# Patient Record
Sex: FEMALE | Race: ASIAN | URBAN - METROPOLITAN AREA
[De-identification: names, ages, dates, MRNs, and addresses within clinical notes are randomized per-mention and may not be internally consistent; named-entity substitution may affect disease eponyms.]

---

## 2022-12-08 ENCOUNTER — OFFICE VISIT (OUTPATIENT)
Facility: LOCATION | Age: 57
End: 2022-12-08
Payer: COMMERCIAL

## 2022-12-08 DIAGNOSIS — H40.013 OPEN ANGLE WITH BORDERLINE FINDINGS, LOW RISK, BILATERAL: ICD-10-CM

## 2022-12-08 DIAGNOSIS — D23.10 OTH BENIGN NEOPLASM SKIN/ UNSP EYELID, INCLUDING CANTHUS: Primary | ICD-10-CM

## 2022-12-08 DIAGNOSIS — H25.13 AGE-RELATED NUCLEAR CATARACT, BILATERAL: ICD-10-CM

## 2022-12-08 DIAGNOSIS — H04.123 TEAR FILM INSUFFICIENCY OF BILATERAL LACRIMAL GLANDS: ICD-10-CM

## 2022-12-08 PROCEDURE — 92004 COMPRE OPH EXAM NEW PT 1/>: CPT | Performed by: OPTOMETRIST

## 2022-12-08 ASSESSMENT — INTRAOCULAR PRESSURE
OD: 12
OS: 12

## 2022-12-08 ASSESSMENT — VISUAL ACUITY
OD: 20/20
OS: 20/20

## 2022-12-08 NOTE — IMPRESSION/PLAN
Impression: Open angle with borderline findings, low risk, bilateral: H40.013. Plan: Discussed findings of today's exam with patient, including diagnosis in detail. Would like patient to return for testing to rule out Glaucoma. Recommend  OCT, Pachymetry, Gonioscopy and Glaucoma Evaluation next available. Patient aware this appointment will take longer than the average appointment. Call with questions, changes in vision or new symptoms.

## 2022-12-08 NOTE — IMPRESSION/PLAN
Impression: Oth benign neoplasm skin/ unsp eyelid, including canthus: D23.10. Plan: Discussed diagnosis in detail with patient. No treatment is required at this time. Patient may be referred to oculoplastics specialist for removal as desires.

## 2023-01-10 ENCOUNTER — OFFICE VISIT (OUTPATIENT)
Facility: LOCATION | Age: 58
End: 2023-01-10
Payer: COMMERCIAL

## 2023-01-10 DIAGNOSIS — H40.013 OPEN ANGLE WITH BORDERLINE FINDINGS, LOW RISK, BILATERAL: Primary | ICD-10-CM

## 2023-01-10 DIAGNOSIS — H04.123 TEAR FILM INSUFFICIENCY OF BILATERAL LACRIMAL GLANDS: ICD-10-CM

## 2023-01-10 DIAGNOSIS — H25.13 AGE-RELATED NUCLEAR CATARACT, BILATERAL: ICD-10-CM

## 2023-01-10 PROCEDURE — 92133 CPTRZD OPH DX IMG PST SGM ON: CPT | Performed by: OPTOMETRIST

## 2023-01-10 PROCEDURE — 99213 OFFICE O/P EST LOW 20 MIN: CPT | Performed by: OPTOMETRIST

## 2023-01-10 PROCEDURE — 76514 ECHO EXAM OF EYE THICKNESS: CPT | Performed by: OPTOMETRIST

## 2023-01-10 ASSESSMENT — INTRAOCULAR PRESSURE
OD: 12
OS: 12

## 2023-01-10 NOTE — IMPRESSION/PLAN
Impression: Age-related nuclear cataract, bilateral: H25.13. Plan: Cataracts noted on exam today which account for patient's complaints. No treatment currently recommended. The patient will monitor vision changes and contact us with any decrease in vision.

## 2023-07-10 ENCOUNTER — OFFICE VISIT (OUTPATIENT)
Facility: LOCATION | Age: 58
End: 2023-07-10
Payer: COMMERCIAL

## 2023-07-10 DIAGNOSIS — H40.013 OPEN ANGLE WITH BORDERLINE FINDINGS, LOW RISK, BILATERAL: Primary | ICD-10-CM

## 2023-07-10 PROCEDURE — 99213 OFFICE O/P EST LOW 20 MIN: CPT | Performed by: OPTOMETRIST

## 2023-07-10 PROCEDURE — 92083 EXTENDED VISUAL FIELD XM: CPT | Performed by: OPTOMETRIST

## 2023-07-10 ASSESSMENT — INTRAOCULAR PRESSURE
OS: 14
OD: 13

## 2023-07-10 NOTE — IMPRESSION/PLAN
Impression: Open angle with borderline findings, low risk, bilateral: H40.013. Plan: Patient advised of stable condition and IOP stable without medication. VF 24-2 performed and evaluated at today's examination- Baseline OU. Will continue to monitor intraocular pressure. No treatment is being implemented at this time. RTC: 6 month complete exam with DFE/Optos/Glare/RNFL.

## 2024-01-10 ENCOUNTER — OFFICE VISIT (OUTPATIENT)
Facility: LOCATION | Age: 59
End: 2024-01-10
Payer: COMMERCIAL

## 2024-01-10 DIAGNOSIS — H04.123 DRY EYE SYNDROME OF BILATERAL LACRIMAL GLANDS: ICD-10-CM

## 2024-01-10 DIAGNOSIS — H25.13 AGE-RELATED NUCLEAR CATARACT, BILATERAL: ICD-10-CM

## 2024-01-10 DIAGNOSIS — H40.013 OPEN ANGLE WITH BORDERLINE FINDINGS, LOW RISK, BILATERAL: Primary | ICD-10-CM

## 2024-01-10 PROCEDURE — 92133 CPTRZD OPH DX IMG PST SGM ON: CPT

## 2024-01-10 PROCEDURE — 92014 COMPRE OPH EXAM EST PT 1/>: CPT

## 2024-01-10 ASSESSMENT — VISUAL ACUITY
OS: 20/20
OD: 20/20

## 2024-01-10 ASSESSMENT — INTRAOCULAR PRESSURE
OS: 10
OD: 9

## 2025-02-19 ENCOUNTER — OFFICE VISIT (OUTPATIENT)
Facility: LOCATION | Age: 60
End: 2025-02-19
Payer: COMMERCIAL

## 2025-02-19 DIAGNOSIS — H16.143 PUNCTATE KERATITIS, BILATERAL: ICD-10-CM

## 2025-02-19 DIAGNOSIS — H40.013 OPEN ANGLE WITH BORDERLINE FINDINGS, LOW RISK, BILATERAL: Primary | ICD-10-CM

## 2025-02-19 DIAGNOSIS — H25.13 AGE-RELATED NUCLEAR CATARACT, BILATERAL: ICD-10-CM

## 2025-02-19 PROCEDURE — 99214 OFFICE O/P EST MOD 30 MIN: CPT

## 2025-02-19 PROCEDURE — 92133 CPTRZD OPH DX IMG PST SGM ON: CPT

## 2025-02-19 PROCEDURE — 92083 EXTENDED VISUAL FIELD XM: CPT

## 2025-02-19 ASSESSMENT — VISUAL ACUITY
OD: 20/20
OS: 20/20

## 2025-02-19 ASSESSMENT — KERATOMETRY
OD: 44.13
OS: 44.25

## 2025-02-19 ASSESSMENT — INTRAOCULAR PRESSURE
OD: 14
OS: 14